# Patient Record
Sex: FEMALE | Race: WHITE | NOT HISPANIC OR LATINO | URBAN - NONMETROPOLITAN AREA
[De-identification: names, ages, dates, MRNs, and addresses within clinical notes are randomized per-mention and may not be internally consistent; named-entity substitution may affect disease eponyms.]

---

## 2019-04-23 ENCOUNTER — IMPORTED ENCOUNTER (OUTPATIENT)
Dept: URBAN - NONMETROPOLITAN AREA CLINIC 1 | Facility: CLINIC | Age: 69
End: 2019-04-23

## 2019-04-23 PROCEDURE — 92014 COMPRE OPH EXAM EST PT 1/>: CPT

## 2019-04-23 PROCEDURE — 92133 CPTRZD OPH DX IMG PST SGM ON: CPT

## 2019-04-23 NOTE — PATIENT DISCUSSION
*CHRONIC ANGLE CLOSURE GLAUCOMA:.-  Discussed findings of exam in detail with the patient. -  Discussed the risk of permanent vision loss and blindness associated with glaucoma. -  Discussed the need for lifelong monitoring to detect signs of glaucoma. -  Informed patient to seek immediate care if they experience symptoms of acute angle closure glaucoma (eg pain blurred vision redness headache nausea/vomiting). increase  IOP TODAY BUT HIGH IOP ON PAST VISITSS/P PI OUMONITOR Q6M

## 2020-07-15 ENCOUNTER — IMPORTED ENCOUNTER (OUTPATIENT)
Dept: URBAN - NONMETROPOLITAN AREA CLINIC 1 | Facility: CLINIC | Age: 70
End: 2020-07-15

## 2020-07-15 PROBLEM — H25.13: Noted: 2020-07-15

## 2020-07-15 PROCEDURE — 92014 COMPRE OPH EXAM EST PT 1/>: CPT

## 2020-07-15 PROCEDURE — 92133 CPTRZD OPH DX IMG PST SGM ON: CPT

## 2020-07-15 NOTE — PATIENT DISCUSSION
*CHRONIC ANGLE CLOSURE GLAUCOMA:.-decreased  IOP TODAY BUT HIGH IOP ON PAST VISITSS/P PI OUCataract OU-Not yet surgical. -Reviewed symptoms of advancing cataract growth such as glare and halos and decreased vision.-Continue to monitor for now. Pt will notify us if any new symptoms develop.

## 2021-07-26 ENCOUNTER — IMPORTED ENCOUNTER (OUTPATIENT)
Dept: URBAN - NONMETROPOLITAN AREA CLINIC 1 | Facility: CLINIC | Age: 71
End: 2021-07-26

## 2021-07-26 PROCEDURE — 92014 COMPRE OPH EXAM EST PT 1/>: CPT

## 2021-07-26 PROCEDURE — 92133 CPTRZD OPH DX IMG PST SGM ON: CPT

## 2021-07-26 NOTE — PATIENT DISCUSSION
*CHRONIC ANGLE CLOSURE GLAUCOMA:.-decreased  IOP TODAY BUT HIGH IOP ON PAST VISITSS/P PI OUCataract OU +progression-Not yet surgical. -Reviewed symptoms of advancing cataract growth such as glare and halos and decreased vision.-Continue to monitor for now. Pt will notify us if any new symptoms develop.

## 2022-04-10 ASSESSMENT — TONOMETRY
OS_IOP_MMHG: 21
OD_IOP_MMHG: 21
OS_IOP_MMHG: 22
OD_IOP_MMHG: 20
OS_IOP_MMHG: 15
OD_IOP_MMHG: 15

## 2022-04-10 ASSESSMENT — VISUAL ACUITY
OS_SC: 20/20-2
OS_CC: J1
OU_CC: J1+
OS_SC: 20/20-2
OS_SC: 20/20
OD_SC: 20/25
OD_SC: 20/25-2
OD_CC: J1